# Patient Record
Sex: MALE | Race: WHITE | URBAN - METROPOLITAN AREA
[De-identification: names, ages, dates, MRNs, and addresses within clinical notes are randomized per-mention and may not be internally consistent; named-entity substitution may affect disease eponyms.]

---

## 2022-12-30 ENCOUNTER — TELEPHONE (OUTPATIENT)
Dept: PAIN MEDICINE | Facility: MEDICAL CENTER | Age: 62
End: 2022-12-30

## 2022-12-30 NOTE — TELEPHONE ENCOUNTER
Caller: [facility]    Doctor:     Reason for call: would like patient records faxed over, that was scanned in his chart from 12/9/22    Call back#: [phone]  Fax# [phone]

## 2022-12-30 NOTE — TELEPHONE ENCOUNTER
Pt has not been seen at [facility] and I don't see TANJA on file please inform pt TANJA needs to be filled out   Thank you

## 2024-02-07 ENCOUNTER — PREP FOR PROCEDURE (OUTPATIENT)
Age: 64
End: 2024-02-07

## 2024-02-07 ENCOUNTER — OFFICE VISIT (OUTPATIENT)
Age: 64
End: 2024-02-07
Payer: MEDICARE

## 2024-02-07 VITALS
OXYGEN SATURATION: 96 % | DIASTOLIC BLOOD PRESSURE: 92 MMHG | HEART RATE: 78 BPM | BODY MASS INDEX: 25.06 KG/M2 | WEIGHT: 185 LBS | HEIGHT: 72 IN | SYSTOLIC BLOOD PRESSURE: 156 MMHG

## 2024-02-07 DIAGNOSIS — Z86.010 HISTORY OF COLON POLYPS: Primary | ICD-10-CM

## 2024-02-07 DIAGNOSIS — R39.11 BENIGN PROSTATIC HYPERPLASIA WITH URINARY HESITANCY: ICD-10-CM

## 2024-02-07 DIAGNOSIS — Z12.11 ENCOUNTER FOR SCREENING COLONOSCOPY: Primary | ICD-10-CM

## 2024-02-07 DIAGNOSIS — Z12.11 ENCOUNTER FOR SCREENING COLONOSCOPY: ICD-10-CM

## 2024-02-07 DIAGNOSIS — G62.9 NEUROPATHY: ICD-10-CM

## 2024-02-07 DIAGNOSIS — M54.9 CHRONIC BACK PAIN, UNSPECIFIED BACK LOCATION, UNSPECIFIED BACK PAIN LATERALITY: ICD-10-CM

## 2024-02-07 DIAGNOSIS — Z72.0 TOBACCO ABUSE: ICD-10-CM

## 2024-02-07 DIAGNOSIS — E89.0 POSTOPERATIVE HYPOTHYROIDISM: ICD-10-CM

## 2024-02-07 DIAGNOSIS — G89.29 CHRONIC BACK PAIN, UNSPECIFIED BACK LOCATION, UNSPECIFIED BACK PAIN LATERALITY: ICD-10-CM

## 2024-02-07 DIAGNOSIS — N40.1 BENIGN PROSTATIC HYPERPLASIA WITH URINARY HESITANCY: ICD-10-CM

## 2024-02-07 DIAGNOSIS — I10 PRIMARY HYPERTENSION: ICD-10-CM

## 2024-02-07 PROCEDURE — 99203 OFFICE O/P NEW LOW 30 MIN: CPT | Performed by: COLON & RECTAL SURGERY

## 2024-02-07 RX ORDER — OXYBUTYNIN CHLORIDE 5 MG/1
5 TABLET ORAL 2 TIMES DAILY
COMMUNITY

## 2024-02-07 RX ORDER — CHLORAL HYDRATE 500 MG
2000 CAPSULE ORAL DAILY
COMMUNITY

## 2024-02-07 RX ORDER — METHOCARBAMOL 750 MG/1
1 TABLET, FILM COATED ORAL AS NEEDED
COMMUNITY
Start: 2023-11-02

## 2024-02-07 RX ORDER — TAMSULOSIN HYDROCHLORIDE 0.4 MG/1
0.4 CAPSULE ORAL EVERY MORNING
COMMUNITY
Start: 2024-01-17

## 2024-02-07 RX ORDER — CARVEDILOL 12.5 MG/1
12.5 TABLET ORAL 2 TIMES DAILY WITH MEALS
COMMUNITY
Start: 2024-02-04

## 2024-02-07 RX ORDER — LORATADINE 10 MG/1
10 CAPSULE, LIQUID FILLED ORAL DAILY
COMMUNITY

## 2024-02-07 RX ORDER — OXYCODONE HYDROCHLORIDE 5 MG/1
5 TABLET ORAL
COMMUNITY
Start: 2023-11-02

## 2024-02-07 RX ORDER — SODIUM PHOSPHATE,MONO-DIBASIC 19G-7G/118
1000 ENEMA (ML) RECTAL DAILY
COMMUNITY

## 2024-02-07 RX ORDER — FINASTERIDE 5 MG/1
5 TABLET, FILM COATED ORAL DAILY
COMMUNITY
Start: 2023-11-01

## 2024-02-07 RX ORDER — HYDRALAZINE HYDROCHLORIDE 25 MG/1
50 TABLET, FILM COATED ORAL 3 TIMES DAILY
COMMUNITY

## 2024-02-07 RX ORDER — CLOPIDOGREL BISULFATE 75 MG/1
75 TABLET ORAL EVERY MORNING
COMMUNITY

## 2024-02-07 RX ORDER — PSEUDOEPHED/ACETAMINOPH/DIPHEN 30MG-500MG
500 TABLET ORAL DAILY
COMMUNITY
Start: 2023-11-02

## 2024-02-07 RX ORDER — TEMAZEPAM 30 MG/1
30 CAPSULE ORAL
COMMUNITY

## 2024-02-07 RX ORDER — LEVOTHYROXINE SODIUM 0.1 MG/1
100 TABLET ORAL EVERY MORNING
COMMUNITY
Start: 2023-12-28

## 2024-02-07 RX ORDER — CARISOPRODOL 350 MG/1
350 TABLET ORAL 2 TIMES DAILY
COMMUNITY

## 2024-02-07 RX ORDER — TIZANIDINE HYDROCHLORIDE 2 MG/1
2 CAPSULE, GELATIN COATED ORAL DAILY
COMMUNITY
Start: 2024-02-06

## 2024-02-07 RX ORDER — GABAPENTIN 300 MG/1
300 CAPSULE ORAL DAILY
COMMUNITY

## 2024-02-07 NOTE — H&P (VIEW-ONLY)
Assessment/Plan:  Age indicated screening colonoscopy.  Last colonoscopy greater than 10 years       Diagnoses and all orders for this visit:    History of colon polyps  -     Cancel: Flexible Sigmoidoscopy; Future    Tobacco abuse    Neuropathy    Primary hypertension    Benign prostatic hyperplasia with urinary hesitancy    Encounter for screening colonoscopy  -     Cancel: Flexible Sigmoidoscopy; Future    Chronic back pain, unspecified back location, unspecified back pain laterality    Postoperative hypothyroidism    Other orders  -     Acetaminophen Extra Strength 500 MG TABS; Take 500 mg by mouth daily  -     carisoprodol (SOMA) 350 mg tablet; Take 350 mg by mouth 2 (two) times a day  -     carvedilol (COREG) 12.5 mg tablet; Take 12.5 mg by mouth 2 (two) times a day with meals  -     Cholecalciferol ( Vitamin D3) 25 MCG (1000 UT) tablet; Take 1,000 Units by mouth daily  -     clopidogrel (PLAVIX) 75 mg tablet; Take 75 mg by mouth every morning  -     finasteride (PROSCAR) 5 mg tablet; Take 5 mg by mouth daily  -     gabapentin (NEURONTIN) 300 mg capsule; Take 300 mg by mouth daily  -     hydrALAZINE (APRESOLINE) 25 mg tablet; Take 50 mg by mouth 3 (three) times a day  -     levothyroxine 100 mcg tablet; Take 100 mcg by mouth every morning Take on an empty stomach  -     Loratadine 10 MG CAPS; Take 10 mg by mouth daily  -     methocarbamol (ROBAXIN) 750 mg tablet; Take 1 tablet by mouth as needed for muscle spasms  -     Omega-3 Fatty Acids (fish oil) 1,000 mg; Take 2,000 mg by mouth daily  -     oxybutynin (DITROPAN) 5 mg tablet; Take 5 mg by mouth 2 (two) times a day  -     oxyCODONE (ROXICODONE) 5 immediate release tablet; Take 5 mg by mouth every 3 (three) hours as needed  -     tamsulosin (FLOMAX) 0.4 mg; Take 0.4 mg by mouth every morning  -     temazepam (RESTORIL) 30 mg capsule; Take 30 mg by mouth daily at bedtime as needed for sleep  -     TiZANidine (ZANAFLEX) 2 MG capsule; Take 2 mg by mouth  daily  -     TURMERIC PO; Take 1,000 mg by mouth daily  -     Diet NPO; Sips with meds; Standing  -     Void on call to OR; Standing  -     Insert peripheral IV; Standing          Subjective:      Patient ID: Rustam Apodaca is a 63 y.o. male.    Patient is a 63-year-old man referred for age indicated screening colonoscopy        The following portions of the patient's history were reviewed and updated as appropriate: allergies, current medications, past family history, past medical history, past social history, past surgical history, and problem list.    Review of Systems   Constitutional:  Negative for chills and fever.   HENT:  Negative for ear pain and sore throat.    Eyes:  Negative for pain and visual disturbance.   Respiratory:  Negative for cough and shortness of breath.    Cardiovascular:  Negative for chest pain and palpitations.   Gastrointestinal:  Negative for abdominal pain and vomiting.   Genitourinary:  Negative for dysuria and hematuria.   Musculoskeletal:  Positive for arthralgias, back pain, gait problem and neck pain.   Skin:  Negative for color change and rash.   Neurological:  Negative for seizures and syncope.   All other systems reviewed and are negative.        Objective:      /92   Pulse 78   Ht 6' (1.829 m)   Wt 83.9 kg (185 lb)   SpO2 96%   BMI 25.09 kg/m²          Physical Exam  Vitals and nursing note reviewed.   Constitutional:       Appearance: Normal appearance.   HENT:      Head: Normocephalic and atraumatic.   Eyes:      Conjunctiva/sclera: Conjunctivae normal.   Cardiovascular:      Rate and Rhythm: Normal rate and regular rhythm.      Heart sounds: Normal heart sounds.   Pulmonary:      Breath sounds: Rales present.   Abdominal:      General: Bowel sounds are normal.      Palpations: Abdomen is soft.   Musculoskeletal:      Cervical back: Neck supple.   Skin:     General: Skin is warm and dry.   Neurological:      Mental Status: He is alert and oriented to person, place,  and time.   Psychiatric:         Mood and Affect: Mood normal.         Thought Content: Thought content normal.         Judgment: Judgment normal.

## 2024-02-07 NOTE — PATIENT INSTRUCTIONS
Scheduled date of colonoscopy (as of today): 2/22/24  Physician performing colonoscopy: Kanchan  Location of colonoscopy: [facility]  Bowel prep reviewed with patient: Golytely  Instructions reviewed with patient by: Maite LOWRY  Clearances:

## 2024-02-21 ENCOUNTER — ANESTHESIA EVENT (OUTPATIENT)
Dept: GASTROENTEROLOGY | Facility: HOSPITAL | Age: 64
End: 2024-02-21

## 2024-02-22 ENCOUNTER — ANESTHESIA (OUTPATIENT)
Dept: GASTROENTEROLOGY | Facility: HOSPITAL | Age: 64
End: 2024-02-22

## 2024-02-22 ENCOUNTER — HOSPITAL ENCOUNTER (OUTPATIENT)
Dept: GASTROENTEROLOGY | Facility: HOSPITAL | Age: 64
Setting detail: OUTPATIENT SURGERY
End: 2024-02-22
Attending: COLON & RECTAL SURGERY
Payer: MEDICARE

## 2024-02-22 VITALS
WEIGHT: 183.2 LBS | RESPIRATION RATE: 20 BRPM | TEMPERATURE: 97.2 F | HEIGHT: 72 IN | BODY MASS INDEX: 24.81 KG/M2 | DIASTOLIC BLOOD PRESSURE: 72 MMHG | OXYGEN SATURATION: 95 % | HEART RATE: 71 BPM | SYSTOLIC BLOOD PRESSURE: 163 MMHG

## 2024-02-22 DIAGNOSIS — Z86.010 HISTORY OF COLON POLYPS: ICD-10-CM

## 2024-02-22 PROCEDURE — 88305 TISSUE EXAM BY PATHOLOGIST: CPT | Performed by: PATHOLOGY

## 2024-02-22 PROCEDURE — 45380 COLONOSCOPY AND BIOPSY: CPT | Performed by: COLON & RECTAL SURGERY

## 2024-02-22 RX ORDER — SODIUM CHLORIDE, SODIUM LACTATE, POTASSIUM CHLORIDE, CALCIUM CHLORIDE 600; 310; 30; 20 MG/100ML; MG/100ML; MG/100ML; MG/100ML
INJECTION, SOLUTION INTRAVENOUS CONTINUOUS PRN
Status: DISCONTINUED | OUTPATIENT
Start: 2024-02-22 | End: 2024-02-22

## 2024-02-22 RX ORDER — PROPOFOL 10 MG/ML
INJECTION, EMULSION INTRAVENOUS AS NEEDED
Status: DISCONTINUED | OUTPATIENT
Start: 2024-02-22 | End: 2024-02-22

## 2024-02-22 RX ORDER — LIDOCAINE HYDROCHLORIDE 10 MG/ML
INJECTION, SOLUTION EPIDURAL; INFILTRATION; INTRACAUDAL; PERINEURAL AS NEEDED
Status: DISCONTINUED | OUTPATIENT
Start: 2024-02-22 | End: 2024-02-22

## 2024-02-22 RX ADMIN — PROPOFOL 30 MG: 10 INJECTION, EMULSION INTRAVENOUS at 09:37

## 2024-02-22 RX ADMIN — PROPOFOL 30 MG: 10 INJECTION, EMULSION INTRAVENOUS at 09:35

## 2024-02-22 RX ADMIN — PROPOFOL 30 MG: 10 INJECTION, EMULSION INTRAVENOUS at 09:27

## 2024-02-22 RX ADMIN — SODIUM CHLORIDE, SODIUM LACTATE, POTASSIUM CHLORIDE, AND CALCIUM CHLORIDE: .6; .31; .03; .02 INJECTION, SOLUTION INTRAVENOUS at 08:54

## 2024-02-22 RX ADMIN — PROPOFOL 30 MG: 10 INJECTION, EMULSION INTRAVENOUS at 09:25

## 2024-02-22 RX ADMIN — PROPOFOL 30 MG: 10 INJECTION, EMULSION INTRAVENOUS at 09:30

## 2024-02-22 RX ADMIN — LIDOCAINE HYDROCHLORIDE 50 MG: 10 INJECTION, SOLUTION EPIDURAL; INFILTRATION; INTRACAUDAL; PERINEURAL at 09:20

## 2024-02-22 RX ADMIN — PROPOFOL 30 MG: 10 INJECTION, EMULSION INTRAVENOUS at 09:33

## 2024-02-22 RX ADMIN — PROPOFOL 100 MG: 10 INJECTION, EMULSION INTRAVENOUS at 09:23

## 2024-02-22 NOTE — ANESTHESIA POSTPROCEDURE EVALUATION
Post-Op Assessment Note    CV Status:  Stable  Pain Score: 0    Pain management: adequate       Mental Status:  Awake and alert   Hydration Status:  Euvolemic   PONV Controlled:  Controlled   Airway Patency:  Patent     Post Op Vitals Reviewed: Yes    No anethesia notable event occurred.    Staff: JOSELO               /56 (02/22/24 0944)    Temp (!) 97.2 °F (36.2 °C) (02/22/24 0944)    Pulse 71 (02/22/24 0944)   Resp 18 (02/22/24 0944)    SpO2 96 % (02/22/24 0944)

## 2024-02-22 NOTE — INTERVAL H&P NOTE
H&P reviewed. After examining the patient I find no changes in the patients condition since the H&P had been written.    Vitals:    02/22/24 0846   BP: (!) 184/85   Pulse: 80   Resp: 18   Temp: 97.6 °F (36.4 °C)   SpO2: 97%

## 2024-02-22 NOTE — ANESTHESIA PREPROCEDURE EVALUATION
Procedure:  COLONOSCOPY  History of colon polyps  -     Cancel: Flexible Sigmoidoscopy; Future     Tobacco abuse     Neuropathy     Primary hypertension     Benign prostatic hyperplasia with urinary hesitancy     Encounter for screening colonoscopy  -     Cancel: Flexible Sigmoidoscopy; Future     Chronic back pain, unspecified back location, unspecified back pain laterality     Postoperative hypothyroidism     (+) Hypertension  (+) Valvular Heart Disease  (aortic insufficiency)  Ejection Fraction: 60-65  Ejection Fraction Date:  (+) Aortic Disease (hx of stented VBI, has ascending anyuerism being watched)  (+) PVD  Additional Comments: 5/2022 stress test normal  Endocrine  (+) Thyroid  ( hypothyroid acquired )    Pulmonary  (+) COPD  (+) Sleep Apnea  Additional Comments: smoker Hematology    GI  (+) Dysphagia  (+) GERD  (+) Esophageal Pathology  (+) Chronic Liver disorder  Neurologic  Additional Comments: Numbness in all 4 extremities, motor intact     (+) Chronic Kidney Disease  (+) Prostatic Disorder  Psychiatric  (+) Depression    GYN  Musculoskeletal  (+) Back Pain   Physical Exam    Airway    Mallampati score: III  TM Distance: >3 FB  Neck ROM: full     Dental       Cardiovascular  Cardiovascular exam normal    Pulmonary  Pulmonary exam normal     Other Findings      History Comments History Comments   Hyperlipidemia  Hypertension    Disease of thyroid gland        Surgical History   Current as of 02/22/24 0816  LARYNX SURGERY BACK SURGERY     Substance History   Current as of 02/22/24 0816  Smoking Status: Every Day   Smokeless Tobacco Status: Current   Alcohol use: Never   Drug use: Not Asked       Anesthesia Plan  ASA Score- 3     Anesthesia Type- IV sedation with anesthesia with ASA Monitors.         Additional Monitors:     Airway Plan:            Plan Factors-    Chart reviewed. EKG reviewed.  Existing labs reviewed. Patient summary reviewed.    Patient is a current smoker.              Induction-  intravenous.    Postoperative Plan-     Informed Consent- Anesthetic plan and risks discussed with patient.  I personally reviewed this patient with the CRNA. Discussed and agreed on the Anesthesia Plan with the CRNA..

## 2024-02-26 PROCEDURE — 88305 TISSUE EXAM BY PATHOLOGIST: CPT | Performed by: PATHOLOGY
